# Patient Record
Sex: MALE | Race: WHITE | NOT HISPANIC OR LATINO | URBAN - METROPOLITAN AREA
[De-identification: names, ages, dates, MRNs, and addresses within clinical notes are randomized per-mention and may not be internally consistent; named-entity substitution may affect disease eponyms.]

---
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
## 2022-03-01 ENCOUNTER — RX ONLY (OUTPATIENT)
Age: 22
Setting detail: RX ONLY
End: 2022-03-01

## 2022-03-01 RX ORDER — TRETINOIN 0.1 MG/G
GEL TOPICAL
Qty: 15 | Refills: 0 | Status: ERX | COMMUNITY
Start: 2022-03-01

## 2022-03-01 RX ORDER — EMOLLIENT COMBINATION NO.32
EMULSION, EXTENDED RELEASE TOPICAL
Qty: 90 | Refills: 0 | Status: ERX | COMMUNITY
Start: 2022-03-01

## 2022-10-18 ENCOUNTER — RX ONLY (OUTPATIENT)
Age: 22
Setting detail: RX ONLY
End: 2022-10-18

## 2022-10-18 RX ORDER — TRETINOIN 0.6 MG/G
GEL TOPICAL
Qty: 50 | Refills: 3 | Status: ERX

## 2022-10-18 RX ORDER — EMOLLIENT COMBINATION NO.32
EMULSION, EXTENDED RELEASE TOPICAL
Qty: 225 | Refills: 3 | Status: ERX

## 2024-02-29 NOTE — PROCEDURE: ISOTRETINOIN MONITORING
Discussed need for MRI of liver due to liver lesion.  Patient with lip piercing.  Discussed need to have metal removed prior to MRI.  Patient will try to remove.   Myalgia Monitoring: I explained this is common when taking isotretinoin. If this worsens they will contact us.

## 2025-06-03 NOTE — PROCEDURE: ISOTRETINOIN MONITORING
actual Display Individual Monthly Dosage In The Note (If Yes Will Display All Dosages Which Are Not N/A): yes